# Patient Record
(demographics unavailable — no encounter records)

---

## 2024-11-20 NOTE — PHYSICAL EXAM
[No Acute Distress] : no acute distress [Well-Appearing] : well-appearing [Normal Sclera/Conjunctiva] : normal sclera/conjunctiva [EOMI] : extraocular movements intact [Normal Outer Ear/Nose] : the outer ears and nose were normal in appearance [Normal Oropharynx] : the oropharynx was normal [No Lymphadenopathy] : no lymphadenopathy [Supple] : supple [Normal] : normal rate, regular rhythm, normal S1 and S2 and no murmur heard [Non Tender] : non-tender [Non-distended] : non-distended [Normal Bowel Sounds] : normal bowel sounds [Normal Posterior Cervical Nodes] : no posterior cervical lymphadenopathy [Normal Anterior Cervical Nodes] : no anterior cervical lymphadenopathy [No Rash] : no rash [Coordination Grossly Intact] : coordination grossly intact [Normal Gait] : normal gait [Normal Affect] : the affect was normal [Normal Insight/Judgement] : insight and judgment were intact

## 2024-11-20 NOTE — ASSESSMENT
[FreeTextEntry1] : #FHx of CRC Pt tried to order Cologuard online, needs a physician to approve Grandfather diagnosed in 50s or 60s Discussed typically starting screening 10 years prior to relative's diagnosis However if pt desires can send Cologuard, discussed that if results are abn, colonoscopy is indicated  #HM Due for flu, covid, thinks UTD on tdap Does not need STI screen today Likely UTD on pap, will check Just completed round of egg freezing, no contraception currently

## 2024-11-20 NOTE — HISTORY OF PRESENT ILLNESS
[de-identified] : Ms. SWARTZ is a 36 year y/o F with no significant PMH who presents as a new patient today to establish care. 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   PMH: as above PSH: wisdom teeth, just did 1st round of egg freezing Allergies: NKDA (seasonal allergies) Medications: none, takes prenatal vitamins prior to egg freezing Fam Hx: m gpa  of colon ca (diagnosed in 50s or 60s), maybe other cancer (?lung?), m gma  of skin ca, p gpa CVD, p gma dementia, dad prostate ca doing well now.  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 Firearms: N  #Health Maintenance Flu shot: not yet, will do today Covid vaccine: will do today Tdap: thinks UTD, went to [facility] Pap: has gyn appt in a couple weeks, ok with doing today STI screen: not today Family Planning: egg freezing as above, no contraception currently

## 2024-11-20 NOTE — HEALTH RISK ASSESSMENT
[Yes] : Yes [2 - 3 times a week (3 pts)] : 2 - 3  times a week (3 points) [1 or 2 (0 pts)] : 1 or 2 (0 points) [Never (0 pts)] : Never (0 points) [0] : 2) Feeling down, depressed, or hopeless: Not at all (0) [PHQ-2 Negative - No further assessment needed] : PHQ-2 Negative - No further assessment needed [Patient reported PAP Smear was normal] : Patient reported PAP Smear was normal [Never] : Never [NO] : No